# Patient Record
Sex: MALE | Race: ASIAN | Employment: FULL TIME | URBAN - METROPOLITAN AREA
[De-identification: names, ages, dates, MRNs, and addresses within clinical notes are randomized per-mention and may not be internally consistent; named-entity substitution may affect disease eponyms.]

---

## 2017-07-21 ENCOUNTER — OFFICE VISIT (OUTPATIENT)
Dept: FAMILY MEDICINE CLINIC | Facility: CLINIC | Age: 37
End: 2017-07-21

## 2017-07-21 VITALS
OXYGEN SATURATION: 99 % | TEMPERATURE: 98 F | DIASTOLIC BLOOD PRESSURE: 68 MMHG | HEIGHT: 68 IN | WEIGHT: 125 LBS | SYSTOLIC BLOOD PRESSURE: 98 MMHG | RESPIRATION RATE: 12 BRPM | BODY MASS INDEX: 18.94 KG/M2 | HEART RATE: 78 BPM

## 2017-07-21 DIAGNOSIS — Z00.00 ROUTINE ADULT HEALTH MAINTENANCE: Primary | ICD-10-CM

## 2017-07-21 DIAGNOSIS — E55.9 VITAMIN D DEFICIENCY: ICD-10-CM

## 2017-07-21 DIAGNOSIS — Z78.9 STRICT VEGETARIAN DIET: ICD-10-CM

## 2017-07-21 PROCEDURE — 99395 PREV VISIT EST AGE 18-39: CPT | Performed by: FAMILY MEDICINE

## 2017-07-25 ENCOUNTER — APPOINTMENT (OUTPATIENT)
Dept: LAB | Age: 37
End: 2017-07-25
Attending: FAMILY MEDICINE
Payer: COMMERCIAL

## 2017-07-25 DIAGNOSIS — E55.9 VITAMIN D DEFICIENCY: ICD-10-CM

## 2017-07-25 DIAGNOSIS — Z78.9 STRICT VEGETARIAN DIET: ICD-10-CM

## 2017-07-25 DIAGNOSIS — Z00.00 ROUTINE ADULT HEALTH MAINTENANCE: ICD-10-CM

## 2017-07-25 LAB
25-HYDROXYVITAMIN D (TOTAL): 29.6 NG/ML (ref 30–100)
ALBUMIN SERPL-MCNC: 4.3 G/DL (ref 3.5–4.8)
ALP LIVER SERPL-CCNC: 63 U/L (ref 45–117)
ALT SERPL-CCNC: 31 U/L (ref 17–63)
AST SERPL-CCNC: 17 U/L (ref 15–41)
BILIRUB SERPL-MCNC: 0.6 MG/DL (ref 0.1–2)
BUN BLD-MCNC: 7 MG/DL (ref 8–20)
CALCIUM BLD-MCNC: 9.2 MG/DL (ref 8.3–10.3)
CHLORIDE: 103 MMOL/L (ref 101–111)
CHOLEST SMN-MCNC: 118 MG/DL (ref ?–200)
CO2: 31 MMOL/L (ref 22–32)
CREAT BLD-MCNC: 1.04 MG/DL (ref 0.7–1.3)
ERYTHROCYTE [DISTWIDTH] IN BLOOD BY AUTOMATED COUNT: 11.7 % (ref 11.5–16)
FREE T4: 1.2 NG/DL (ref 0.9–1.8)
GLUCOSE BLD-MCNC: 90 MG/DL (ref 70–99)
HAV AB SERPL IA-ACNC: 372 PG/ML (ref 193–986)
HCT VFR BLD AUTO: 50 % (ref 37–53)
HDLC SERPL-MCNC: 38 MG/DL (ref 45–?)
HDLC SERPL: 3.11 {RATIO} (ref ?–4.97)
HGB BLD-MCNC: 16.8 G/DL (ref 13–17)
LDLC SERPL CALC-MCNC: 46 MG/DL (ref ?–130)
LDLC SERPL-MCNC: 34 MG/DL (ref 5–40)
M PROTEIN MFR SERPL ELPH: 7.7 G/DL (ref 6.1–8.3)
MCH RBC QN AUTO: 29.8 PG (ref 27–33.2)
MCHC RBC AUTO-ENTMCNC: 33.6 G/DL (ref 31–37)
MCV RBC AUTO: 88.7 FL (ref 80–99)
NONHDLC SERPL-MCNC: 80 MG/DL (ref ?–130)
PLATELET # BLD AUTO: 177 10(3)UL (ref 150–450)
POTASSIUM SERPL-SCNC: 4.6 MMOL/L (ref 3.6–5.1)
RBC # BLD AUTO: 5.64 X10(6)UL (ref 4.3–5.7)
RED CELL DISTRIBUTION WIDTH-SD: 37.4 FL (ref 35.1–46.3)
SODIUM SERPL-SCNC: 139 MMOL/L (ref 136–144)
TRIGLYCERIDES: 169 MG/DL (ref ?–150)
TSI SER-ACNC: 2.43 MIU/ML (ref 0.35–5.5)
WBC # BLD AUTO: 4.1 X10(3) UL (ref 4–13)

## 2017-07-25 PROCEDURE — 85027 COMPLETE CBC AUTOMATED: CPT

## 2017-07-25 PROCEDURE — 80061 LIPID PANEL: CPT

## 2017-07-25 PROCEDURE — 82607 VITAMIN B-12: CPT

## 2017-07-25 PROCEDURE — 80053 COMPREHEN METABOLIC PANEL: CPT

## 2017-07-25 PROCEDURE — 82306 VITAMIN D 25 HYDROXY: CPT

## 2017-07-25 PROCEDURE — 36415 COLL VENOUS BLD VENIPUNCTURE: CPT

## 2017-07-25 PROCEDURE — 84439 ASSAY OF FREE THYROXINE: CPT

## 2017-07-25 PROCEDURE — 84443 ASSAY THYROID STIM HORMONE: CPT

## 2017-07-25 NOTE — PROGRESS NOTES
Angelica Hammans is a 42year old male who presents for a complete physical exam.   HPI:   Pt complains of nothing.   Urination changes no  ED symptoms no  Immunizations needed no  Wt Readings from Last 6 Encounters:  07/21/17 : 125 lb  10/21/15 : 123 Basophil Absolute 0.01 0.00 - 0.10 x10(3) uL   Neutrophil % 50.8 %   Lymphocyte % 40.8 %   Monocyte % 6.5 %   Eosinophil % 1.6 %   Basophil % 0.3 %         No current outpatient prescriptions on file. No past medical history on file.    No past surgical nodules/masses  CHEST: no chest tenderness  BREAST: no suspicious masses appreciated on palpation  LUNGS: CTA A/P, no wheezes/ronchi/rales/crackles, normal air excursion  CARDIOVASCULAR: RRR, no murmur, no lower extremity edema, pedal and femoral pulses 2+

## 2017-08-10 ENCOUNTER — TELEPHONE (OUTPATIENT)
Dept: FAMILY MEDICINE CLINIC | Facility: CLINIC | Age: 37
End: 2017-08-10

## 2017-08-10 DIAGNOSIS — E55.9 VITAMIN D DEFICIENCY: Primary | ICD-10-CM

## 2017-08-10 NOTE — TELEPHONE ENCOUNTER
----- Message from Cece Lynn MD sent at 8/10/2017  2:16 PM CDT -----  Labs are within normal limits except vitamin D is borderline low at 29. I would recommend vitamin D 6634-6771 units daily. Recheck vitamin D level in 3 months.   Triglycerides are

## 2017-08-10 NOTE — TELEPHONE ENCOUNTER
Spoke to pt regarding lab results below. Vit D lab ordered to be done in 3 months. All questions answered.

## 2017-09-08 ENCOUNTER — TELEPHONE (OUTPATIENT)
Dept: FAMILY MEDICINE CLINIC | Facility: CLINIC | Age: 37
End: 2017-09-08

## 2017-09-08 ENCOUNTER — OFFICE VISIT (OUTPATIENT)
Dept: FAMILY MEDICINE CLINIC | Facility: CLINIC | Age: 37
End: 2017-09-08

## 2017-09-08 VITALS
DIASTOLIC BLOOD PRESSURE: 70 MMHG | OXYGEN SATURATION: 98 % | RESPIRATION RATE: 16 BRPM | BODY MASS INDEX: 19 KG/M2 | TEMPERATURE: 98 F | WEIGHT: 126 LBS | SYSTOLIC BLOOD PRESSURE: 112 MMHG | HEART RATE: 84 BPM

## 2017-09-08 DIAGNOSIS — Z23 NEED FOR TETANUS BOOSTER: Primary | ICD-10-CM

## 2017-09-08 PROCEDURE — 90714 TD VACC NO PRESV 7 YRS+ IM: CPT | Performed by: PHYSICIAN ASSISTANT

## 2017-09-08 PROCEDURE — 90471 IMMUNIZATION ADMIN: CPT | Performed by: PHYSICIAN ASSISTANT

## 2017-09-08 NOTE — TELEPHONE ENCOUNTER
Patient stepped on a nail and does not know when his last Tetanus shot was, advised patient go to a [facility].  Patient verbalizes understanding

## 2017-09-08 NOTE — PROGRESS NOTES
S:  Patient reports to clinic for tetanus vaccine. He explains he stepped on a nail yesterday. He was wearing shoes and it did puncture his foot. Patient report the nail only penetrated very superficially.   He has already cleaned and irrigated the wound

## 2017-09-08 NOTE — TELEPHONE ENCOUNTER
Pt stepped on nail in garage last night. Pt states that it was bleeding and that he put peroxide and ointment on it. Pt did not know if he needed shot. Spoke to Aye Ibrahim, clinical supervisor, who recommended that he go to [facility] and get tetanus shot.

## 2017-09-12 ENCOUNTER — OFFICE VISIT (OUTPATIENT)
Dept: FAMILY MEDICINE CLINIC | Facility: CLINIC | Age: 37
End: 2017-09-12

## 2017-09-12 VITALS
DIASTOLIC BLOOD PRESSURE: 70 MMHG | SYSTOLIC BLOOD PRESSURE: 102 MMHG | TEMPERATURE: 98 F | RESPIRATION RATE: 16 BRPM | BODY MASS INDEX: 19.6 KG/M2 | WEIGHT: 126.38 LBS | HEIGHT: 67.25 IN | HEART RATE: 72 BPM

## 2017-09-12 DIAGNOSIS — S46.912A STRAIN OF LEFT SHOULDER, INITIAL ENCOUNTER: Primary | ICD-10-CM

## 2017-09-12 PROCEDURE — 99213 OFFICE O/P EST LOW 20 MIN: CPT | Performed by: FAMILY MEDICINE

## 2017-09-12 RX ORDER — METHYLPREDNISOLONE 4 MG/1
TABLET ORAL
Qty: 1 KIT | Refills: 0 | Status: SHIPPED | OUTPATIENT
Start: 2017-09-12 | End: 2018-08-21 | Stop reason: ALTCHOICE

## 2017-09-12 NOTE — PROGRESS NOTES
HPI:    Patient ID: Ashlee Viveros is a 42year old male. HPI  Patient is here with complaint of left shoulder pain.   He states on Friday, 9/8/2017 he woke up with some slight pain in the left clavicle area and that day he had a nail wound to th handgrip bilateral no loss of sensation in either upper extremity         ASSESSMENT/PLAN:   Strain of left shoulder, initial encounter  (primary encounter diagnosis)  I think the shoulder pain may be from sleeping the wrong way and is probably not related

## 2018-08-21 ENCOUNTER — OFFICE VISIT (OUTPATIENT)
Dept: FAMILY MEDICINE CLINIC | Facility: CLINIC | Age: 38
End: 2018-08-21
Payer: COMMERCIAL

## 2018-08-21 ENCOUNTER — APPOINTMENT (OUTPATIENT)
Dept: LAB | Age: 38
End: 2018-08-21
Attending: FAMILY MEDICINE
Payer: COMMERCIAL

## 2018-08-21 VITALS
SYSTOLIC BLOOD PRESSURE: 110 MMHG | WEIGHT: 122 LBS | HEIGHT: 67 IN | TEMPERATURE: 98 F | DIASTOLIC BLOOD PRESSURE: 70 MMHG | RESPIRATION RATE: 12 BRPM | HEART RATE: 72 BPM | OXYGEN SATURATION: 99 % | BODY MASS INDEX: 19.15 KG/M2

## 2018-08-21 DIAGNOSIS — E55.9 VITAMIN D DEFICIENCY: ICD-10-CM

## 2018-08-21 DIAGNOSIS — Z00.00 ROUTINE GENERAL MEDICAL EXAMINATION AT A HEALTH CARE FACILITY: ICD-10-CM

## 2018-08-21 DIAGNOSIS — Z13.31 NEGATIVE DEPRESSION SCREENING: ICD-10-CM

## 2018-08-21 DIAGNOSIS — Z00.00 ROUTINE GENERAL MEDICAL EXAMINATION AT A HEALTH CARE FACILITY: Primary | ICD-10-CM

## 2018-08-21 LAB
ALBUMIN SERPL-MCNC: 4.3 G/DL (ref 3.5–4.8)
ALBUMIN/GLOB SERPL: 1.3 {RATIO} (ref 1–2)
ALP LIVER SERPL-CCNC: 69 U/L (ref 45–117)
ALT SERPL-CCNC: 22 U/L (ref 17–63)
ANION GAP SERPL CALC-SCNC: 4 MMOL/L (ref 0–18)
AST SERPL-CCNC: 17 U/L (ref 15–41)
BILIRUB SERPL-MCNC: 0.5 MG/DL (ref 0.1–2)
BUN BLD-MCNC: 7 MG/DL (ref 8–20)
BUN/CREAT SERPL: 7.7 (ref 10–20)
CALCIUM BLD-MCNC: 9.4 MG/DL (ref 8.3–10.3)
CHLORIDE SERPL-SCNC: 104 MMOL/L (ref 101–111)
CHOLEST SMN-MCNC: 127 MG/DL (ref ?–200)
CO2 SERPL-SCNC: 32 MMOL/L (ref 22–32)
CREAT BLD-MCNC: 0.91 MG/DL (ref 0.7–1.3)
ERYTHROCYTE [DISTWIDTH] IN BLOOD BY AUTOMATED COUNT: 11.9 % (ref 11.5–16)
GLOBULIN PLAS-MCNC: 3.3 G/DL (ref 2.5–4)
GLUCOSE BLD-MCNC: 83 MG/DL (ref 70–99)
HCT VFR BLD AUTO: 49.7 % (ref 37–53)
HDLC SERPL-MCNC: 36 MG/DL (ref 40–59)
HGB BLD-MCNC: 15.7 G/DL (ref 13–17)
LDLC SERPL CALC-MCNC: 55 MG/DL (ref ?–100)
M PROTEIN MFR SERPL ELPH: 7.6 G/DL (ref 6.1–8.3)
MCH RBC QN AUTO: 28.6 PG (ref 27–33.2)
MCHC RBC AUTO-ENTMCNC: 31.6 G/DL (ref 31–37)
MCV RBC AUTO: 90.5 FL (ref 80–99)
NONHDLC SERPL-MCNC: 91 MG/DL (ref ?–130)
OSMOLALITY SERPL CALC.SUM OF ELEC: 287 MOSM/KG (ref 275–295)
PLATELET # BLD AUTO: 171 10(3)UL (ref 150–450)
POTASSIUM SERPL-SCNC: 4.5 MMOL/L (ref 3.6–5.1)
RBC # BLD AUTO: 5.49 X10(6)UL (ref 4.3–5.7)
RED CELL DISTRIBUTION WIDTH-SD: 39.5 FL (ref 35.1–46.3)
SODIUM SERPL-SCNC: 140 MMOL/L (ref 136–144)
T4 FREE SERPL-MCNC: 1.2 NG/DL (ref 0.9–1.8)
TRIGL SERPL-MCNC: 182 MG/DL (ref 30–149)
TSI SER-ACNC: 1.99 MIU/ML (ref 0.35–5.5)
VIT D+METAB SERPL-MCNC: 22.7 NG/ML (ref 30–100)
VLDLC SERPL CALC-MCNC: 36 MG/DL (ref 0–30)
WBC # BLD AUTO: 4.2 X10(3) UL (ref 4–13)

## 2018-08-21 PROCEDURE — 85027 COMPLETE CBC AUTOMATED: CPT

## 2018-08-21 PROCEDURE — 84443 ASSAY THYROID STIM HORMONE: CPT

## 2018-08-21 PROCEDURE — 80061 LIPID PANEL: CPT

## 2018-08-21 PROCEDURE — 36415 COLL VENOUS BLD VENIPUNCTURE: CPT

## 2018-08-21 PROCEDURE — 82306 VITAMIN D 25 HYDROXY: CPT

## 2018-08-21 PROCEDURE — 99396 PREV VISIT EST AGE 40-64: CPT | Performed by: FAMILY MEDICINE

## 2018-08-21 PROCEDURE — 80053 COMPREHEN METABOLIC PANEL: CPT

## 2018-08-21 PROCEDURE — 84439 ASSAY OF FREE THYROXINE: CPT

## 2018-08-21 NOTE — PROGRESS NOTES
Tereza Allen is a 34year old male who presents for a complete physical exam.   HPI:   Pt complains of nothing.   Urination changes no  ED symptoms no  Immunizations needed no  Wt Readings from Last 6 Encounters:  08/21/18 : 122 lb  09/12/17 : 126 history on file. No family history on file. Social History:  Smoking status: Never Smoker                                                              Smokeless tobacco: Never Used                      Alcohol use:  No                  REVIEW OF SYSTEMS symmetric b/l  GI: normoactive BS, non-distended, non-tender to palpation, no HSM/masses/pulsations  MUSCULOSKELETAL: Back with normal AROM, no joint swelling, extremities x 4 with normal strength 5/5 and symmetric and with normal AROM/PROM.    EXTREMITIES:

## 2018-09-08 ENCOUNTER — PATIENT MESSAGE (OUTPATIENT)
Dept: FAMILY MEDICINE CLINIC | Facility: CLINIC | Age: 38
End: 2018-09-08

## 2018-09-08 DIAGNOSIS — E53.8 VITAMIN B12 DEFICIENCY: Primary | ICD-10-CM

## 2018-09-10 NOTE — TELEPHONE ENCOUNTER
Requesting B12 lab order  LOV: 8/21/18  RTC: none specified  Last Labs: 8/21/18-B12 not tested    No future appointments.     Lab order pended

## 2019-06-11 ENCOUNTER — OFFICE VISIT (OUTPATIENT)
Dept: FAMILY MEDICINE CLINIC | Facility: CLINIC | Age: 39
End: 2019-06-11
Payer: COMMERCIAL

## 2019-06-11 VITALS
RESPIRATION RATE: 16 BRPM | WEIGHT: 124 LBS | HEART RATE: 56 BPM | BODY MASS INDEX: 19.46 KG/M2 | TEMPERATURE: 98 F | OXYGEN SATURATION: 99 % | HEIGHT: 67 IN | DIASTOLIC BLOOD PRESSURE: 74 MMHG | SYSTOLIC BLOOD PRESSURE: 118 MMHG

## 2019-06-11 DIAGNOSIS — B35.4 TINEA CORPORIS: Primary | ICD-10-CM

## 2019-06-11 PROCEDURE — 99213 OFFICE O/P EST LOW 20 MIN: CPT | Performed by: FAMILY MEDICINE

## 2019-06-11 NOTE — PROGRESS NOTES
HPI:    Patient ID: Bertha Higginbotham is a 37year old male. HPI  Patient is here with complaint of 2week rash on the right anterior chest wall near the axilla. It has itching associated with it. No new exposures.    Review of Systems  Negative ex

## (undated) NOTE — LETTER
[facility], [address] , [address]  [place] [id number] [phone]            January 12, 2018      [facility]  [address]      Dear Elisabet Augustine:     Our records indicat